# Patient Record
(demographics unavailable — no encounter records)

---

## 2024-11-07 NOTE — PHYSICAL EXAM
[de-identified] :  General: No acute distress, conversant, well-nourished. Head: Normocephalic, atraumatic Neck: trachea midline, FROM Heart: normotensive and normal rate and rhythm Lungs: No labored breathing Skin: No abrasions, no rashes, no edema Psych: Alert and oriented to person, place and time Extremities: no peripheral edema or digital cyanosis Gait: Normal gait. Can perform tandem gait.  Vascular: warm and well perfused distally, palpable distal pulses   MSK: Lumbar spine: + tenderness to palpation.  No step-off, no deformity.   NEURO EXAM: Sensation Left L2  -  2/2            Left L3  -  2/2 Left L4  -  2/2 Left L5  -  2/2 Left S1  -  2/2   Right L2  -  2/2            Right L3  -  2/2 Right L4  -  2/2 Right L5  -  2/2 Right S1  -  2/2   Motor: Left L2 (hip flexion)                            5/5                Left L3 (knee extension)                   5/5                Left L4 (ankle dorsiflexion)                 5/5                Left L5 (long toe extensor)                5/5                Left S1 (ankle plantar flexion)           5/5   Right L2 (hip flexion)                            5/5                Right L3 (knee extension)                   5/5                Right L4 (ankle dorsiflexion)                 5/5                Right L5 (long toe extensor)                5/5                Right S1 (ankle plantar flexion)           5/5   Reflexes: Normal and symmetric Negative clonus.  Down-going Babinski.

## 2024-11-07 NOTE — REASON FOR VISIT
[Follow-Up Visit] : a follow-up visit for [Back Pain] : back pain [Other: ____] : [unfilled] [FreeTextEntry2] : pain level knee 9/10,

## 2024-11-07 NOTE — HISTORY OF PRESENT ILLNESS
[de-identified] : 68 year old female followup with low back pain radiating to her right leg.  She has numbness in her right leg. She denies recent illness, fevers,  weakness, balance problems, saddle anesthesia, urinary retention or fecal incontinence. Since her last visit, patient had right THR with Dr. Schilling which has provided great relief. She had an epidural injection prior to the operation which gave her 50% relief. She has some lingering right sided lower back pain and pain over her SI joint.

## 2024-11-07 NOTE — DISCUSSION/SUMMARY
[de-identified] :  I, Dr. Hernandez personally performed the evaluation and management services for this established patient who presents today with (a) new problem(s)/exacerbation of (an) existing condition(s). That E/M includes conducting the clinically appropriate interval history &/or exam, assessing all new/exacerbated conditions, and establishing a new plan of care. Today, my ALAN, Alejandro Noel was here to observe my evaluation and management service for this new problem/exacerbated condition and follow the plan of care established by me going forward.

## 2024-11-07 NOTE — PHYSICAL EXAM
[de-identified] :  General: No acute distress, conversant, well-nourished. Head: Normocephalic, atraumatic Neck: trachea midline, FROM Heart: normotensive and normal rate and rhythm Lungs: No labored breathing Skin: No abrasions, no rashes, no edema Psych: Alert and oriented to person, place and time Extremities: no peripheral edema or digital cyanosis Gait: Normal gait. Can perform tandem gait.  Vascular: warm and well perfused distally, palpable distal pulses   MSK: Lumbar spine: + tenderness to palpation.  No step-off, no deformity.   NEURO EXAM: Sensation Left L2  -  2/2            Left L3  -  2/2 Left L4  -  2/2 Left L5  -  2/2 Left S1  -  2/2   Right L2  -  2/2            Right L3  -  2/2 Right L4  -  2/2 Right L5  -  2/2 Right S1  -  2/2   Motor: Left L2 (hip flexion)                            5/5                Left L3 (knee extension)                   5/5                Left L4 (ankle dorsiflexion)                 5/5                Left L5 (long toe extensor)                5/5                Left S1 (ankle plantar flexion)           5/5   Right L2 (hip flexion)                            5/5                Right L3 (knee extension)                   5/5                Right L4 (ankle dorsiflexion)                 5/5                Right L5 (long toe extensor)                5/5                Right S1 (ankle plantar flexion)           5/5   Reflexes: Normal and symmetric Negative clonus.  Down-going Babinski.

## 2024-11-07 NOTE — ASSESSMENT
[FreeTextEntry1] : 68 year old female followup with low back pain radiating to her right leg.  She has numbness in her right leg. She denies recent illness, fevers,  weakness, balance problems, saddle anesthesia, urinary retention or fecal incontinence. Since her last visit, patient had right THR with Dr. Schilling which has provided great relief. She had an epidural injection prior to the operation which gave her 50% relief. She has some lingering right sided lower back pain and pain over her SI joint. The patient was given a referral for consideration for spinal injections. Continue PT for hip. Continue HEP for lower back. F/U in 4-6 weeks. We discussed red flag symptoms that would require emergent evaluation. She knows to call with any questions or concerns or if her symptoms acutely worsen.

## 2024-11-07 NOTE — HISTORY OF PRESENT ILLNESS
[de-identified] : 68 year old female followup with low back pain radiating to her right leg.  She has numbness in her right leg. She denies recent illness, fevers,  weakness, balance problems, saddle anesthesia, urinary retention or fecal incontinence. Since her last visit, patient had right THR with Dr. Schilling which has provided great relief. She had an epidural injection prior to the operation which gave her 50% relief. She has some lingering right sided lower back pain and pain over her SI joint.

## 2024-11-15 NOTE — PROCEDURE
[FreeTextEntry1] : Interlaminar lumbar epidural steroid injection at L5-S1    The potential benefits as well as rare but possible risks were reviewed with the patient.  These risks including infection including epidural abscess, meningitis, osteomyelitis, and discitis, bleeding including epidural hematoma, nerve injury, paralysis, failure to relieve pain or worse pain, headache, pneumothorax, elevated blood sugars, allergic reactions, adverse reactions to medications, vasovagal reactions, falls, etc. Following that discussion, all questions were again answered to the patients satisfaction, the patient stated their verbal understanding and written consent was obtained.   After obtaining consent, pre-procedure blood pressure and heart rate were stable and recorded in the nursing record. Standard monitors were applied. The patient was placed in the prone position. The lumbosacral area was widely prepped with chloroprep and draped in sterile fashion. Fluoroscopic guidance was used to identify the desired interlaminar space and for needle placement. Subcutaneous 0.5% lidocaine was used to anesthetize the skin overlying the target. A 20-gauge Tuohy needle was advanced to the epidural space using loss of resistance to air technique and AP / contralateral oblique views under fluoroscopy. There was no evidence of heme or CSF and no paresthesias were elicited with needle placement.   Confirmation of epidural needle placement was performed with 0.5 cc of omnipaque Next 3 ml preservative free normal saline and 10 mg dexamethasone was administered epidurally with no pain elicited on injection. The needle was removed, skin cleansed with alcohol and a sterile bandage was applied. The patient tolerated the procedure well and no complications were encountered. Following the procedure, the patient's vital signs were stable. The patient was discharged home in good condition with post-procedural instructions.   Time Out: Immediately prior to the procedure, the following was verbally confirmed that there is a consent form and that the correct patient, planned procedure, site and side are consistent with documentation and that necessary equipment and/or blood products are available prior to the start of the case.   Complications: none EBL: <5 cc

## 2024-12-13 NOTE — ASSESSMENT
[FreeTextEntry1] :  68 year old female s/p right THR 8/28/24, with chronic low back pain and left knee pain, presenting for follow up after having a LESI L5-S1 on 11/15/24. Since the injection, she reports significant pain relief in the right side of low back and right posterior lateral radiculopathy. She has noticed some pain in the right buttock has returned, although minimal. Patient reports significant left knee pain, worse with daily activity, walking, and stairs. She is in the process of scheduling a TKR with Dr. López in the Spring 2025.   Plan: - Discussed Femoral SPR PNS. Patient was given brochure.  - Discussed Neuralace of femoral nerve. will do 1 session today - Patient to follow up for SPR

## 2024-12-13 NOTE — HISTORY OF PRESENT ILLNESS
[Back Pain] : back pain [Joint Pain] : joint  [FreeTextEntry1] : Patient is a 68 year old female s/p right THR 8/28/24, with chronic low back pain and left knee pain, presenting for follow up after having a LESI L5-S1 on 11/15/24. Since the injection, she reports significant pain relief in the right side of low back and right posterior lateral radiculopathy. She has noticed some pain in the right buttock has returned, although minimal.  Her main pain today is her left knee. She has had this pain x 5-6 years. She has a history of injury decades ago falling as an . She has a history of left knee cartilage surgery. She last had imaging of her knee and was told it is "bone-on-bone." She is working on scheduling a left knee replacement in April 2025.   She has been doing HEP and pilates.

## 2024-12-13 NOTE — PHYSICAL EXAM
If you receive a survey via e-mail or mail, please take the time to complete and return as your feedback is very helpful to our practice.               If your neurological testing is not going to be scheduled today our Pre-Service Department will contact you to schedule within one to two days. If you would like to call and schedule when it is convenient for you or if you need to reschedule your appointment please call the Pre-Service Department at [phone].    Your tests will be reviewed by the Benefits Department and if there are any concerns regarding prior authorization or financial obligation they will contact you. We recommend you still contact your insurance company to see if the test, provider, and location of service are covered, and if so, will there be any out of pocket expenses.     If you should have any concerns regarding the financial obligation of the tests please contact our Financial Advocates at [phone] and they will be happy to assist you.                 Thank you for letting us care for you today.             In order to make sure we are meeting our patients' needs, we require a 36 hour notice from you prior to picking up your medications. Attached is a table that will help you determine when your prescriptions will be ready for pick-up.                      If the refill is requested between when the clinic opens and 12 pm on  You can  your prescription at the pharmacy after 6 PM on   Monday Tuesday Tuesday Wednesday Wednesday Thursday Thursday Friday Friday Monday     If the refill is requested between 12 pm and after the clinic closes on You can  your prescription at the pharmacy after 12 PM on   Monday Wednesday Tuesday Thursday Wednesday Friday Thursday Monday Friday Tuesday        [Normal] : Regular, no tachycardia [Normal muscle bulk without asymmetry] : normal muscle bulk without asymmetry [Spinous Process Tenderness] : spinous process tenderness [] : Motor: [NL] : Motor strength of the right lower extremity was normal [___/5] : left ([unfilled]/5) [Motor Strength Lower Extremities] : left (5/5) [LE] : Sensory: Intact in bilateral lower extremities [de-identified] : left knee ttp on medial and lateral aspect  [de-identified] : limited ROM of left knee negative

## 2025-01-09 NOTE — HISTORY OF PRESENT ILLNESS
[de-identified] : 68 year old female followup with low back pain radiating to her right leg.  She has numbness in her right leg. She denies recent illness, fevers,  weakness, balance problems, saddle anesthesia, urinary retention or fecal incontinence. Since her last visit, patient had a lumbar epidural by Dr. Pavon with good relief. However she does feel some of her symptoms are returning.  Her knee is bothering her and she is going to see Dr. Schilling.  She is doing PT for her hip.

## 2025-01-09 NOTE — PHYSICAL EXAM
[de-identified] : General: No acute distress, conversant, well-nourished. Head: Normocephalic, atraumatic Neck: trachea midline, FROM Heart: normotensive and normal rate and rhythm Lungs: No labored breathing Skin: No abrasions, no rashes, no edema Psych: Alert and oriented to person, place and time Extremities: no peripheral edema or digital cyanosis Gait: Normal gait. Can perform tandem gait.   Vascular: warm and well perfused distally, palpable distal pulses MSK: Left Knee with no erythema, no effusion.   + tenderness to palpation of knee.  Range of motion: 0 - 130 degrees + pain with range of motion.  Negative Tova's test. Stable to varus and valgus stress. Negative Lachman's test, negative anterior and posterior drawer tests.    Sensation intact to light touch.   Normal motor exam. Warm and well perfused distally. Lumbar spine: No tenderness to palpation.  No step-off, no deformity.  NEURO EXAM: Sensation  Left L2  -  2/2             Left L3  -  2/2 Left L4  -  2/2 Left L5  -  2/2 Left S1  -  2/2  Right L2  -  2/2             Right L3  -  2/2 Right L4  -  2/2 Right L5  -  2/2 Right S1  -  2/2  Motor:  Left L2 (hip flexion)                            5/5                 Left L3 (knee extension)                   5/5                 Left L4 (ankle dorsiflexion)                 5/5                 Left L5 (long toe extensor)                5/5                 Left S1 (ankle plantar flexion)           5/5  Right L2 (hip flexion)                            5/5                 Right L3 (knee extension)                   5/5                 Right L4 (ankle dorsiflexion)                 5/5                 Right L5 (long toe extensor)                5/5                 Right S1 (ankle plantar flexion)           5/5  Reflexes: Normal and symmetric Negative clonus.  Down-going Babinski.    [de-identified] : Left knee 3 view radiographs no fracture or dislocation.  Severe age-related degenerative changes most pronounced in medial compartment. Partially imaged stem of KURT  I independently reviewed the patients MRI which show degenerative changes including severe foraminal stenosis.    MRI of the lumbar spine 11/10/2023  Transitional lumbosacral anatomy, with designation of a partially sacralized L5 transitional segment.  Multilevel degenerative changes contributing to mild spinal canal stenosis at L3-4, and mild thecal sac impingement at L2-3 and L4-5.  Moderate to severe right foraminal stenosis at L4-5, mild to moderate bilaterally at L3-4, and mild on the left at L2-3.  Grade 1 anterolisthesis of L3 on L4.  Moderate to severe lower lumbar facet arthrosis.  Dilatation of the partially imaged common bile duct. Recommend follow-up with abdominal ultrasound, and as warranted clinically, MRI/MRCP of the abdomen without and with contrast.

## 2025-01-09 NOTE — HISTORY OF PRESENT ILLNESS
[de-identified] : 68 year old female followup with low back pain radiating to her right leg.  She has numbness in her right leg. She denies recent illness, fevers,  weakness, balance problems, saddle anesthesia, urinary retention or fecal incontinence. Since her last visit, patient had a lumbar epidural by Dr. Pavon with good relief. However she does feel some of her symptoms are returning.  Her knee is bothering her and she is going to see Dr. Schilling.  She is doing PT for her hip.

## 2025-01-09 NOTE — REASON FOR VISIT
[Follow-Up Visit] : a follow-up visit for [Back Pain] : back pain [Other: ____] : [unfilled] [FreeTextEntry2] : pain level 6/10

## 2025-01-09 NOTE — ASSESSMENT
[FreeTextEntry1] : 68 year old female followup with low back pain radiating to her right leg.  She has numbness in her right leg. She denies recent illness, fevers,  weakness, balance problems, saddle anesthesia, urinary retention or fecal incontinence. Since her last visit, patient had a lumbar epidural by Dr. Pavon with good relief. However she does feel some of her symptoms are returning.  Her knee is bothering her and she is going to see Dr. Schilling.  She is doing PT for her hip.  She can continue PT.  Continue gabapentin. Can consider additional spinal injections.  F/U 6-8 weeks. We discussed red flag symptoms that would require emergent evaluation. She knows to call with any questions or concerns or if her symptoms acutely worsen.

## 2025-01-09 NOTE — PHYSICAL EXAM
[de-identified] : General: No acute distress, conversant, well-nourished. Head: Normocephalic, atraumatic Neck: trachea midline, FROM Heart: normotensive and normal rate and rhythm Lungs: No labored breathing Skin: No abrasions, no rashes, no edema Psych: Alert and oriented to person, place and time Extremities: no peripheral edema or digital cyanosis Gait: Normal gait. Can perform tandem gait.   Vascular: warm and well perfused distally, palpable distal pulses MSK: Left Knee with no erythema, no effusion.   + tenderness to palpation of knee.  Range of motion: 0 - 130 degrees + pain with range of motion.  Negative Tova's test. Stable to varus and valgus stress. Negative Lachman's test, negative anterior and posterior drawer tests.    Sensation intact to light touch.   Normal motor exam. Warm and well perfused distally. Lumbar spine: No tenderness to palpation.  No step-off, no deformity.  NEURO EXAM: Sensation  Left L2  -  2/2             Left L3  -  2/2 Left L4  -  2/2 Left L5  -  2/2 Left S1  -  2/2  Right L2  -  2/2             Right L3  -  2/2 Right L4  -  2/2 Right L5  -  2/2 Right S1  -  2/2  Motor:  Left L2 (hip flexion)                            5/5                 Left L3 (knee extension)                   5/5                 Left L4 (ankle dorsiflexion)                 5/5                 Left L5 (long toe extensor)                5/5                 Left S1 (ankle plantar flexion)           5/5  Right L2 (hip flexion)                            5/5                 Right L3 (knee extension)                   5/5                 Right L4 (ankle dorsiflexion)                 5/5                 Right L5 (long toe extensor)                5/5                 Right S1 (ankle plantar flexion)           5/5  Reflexes: Normal and symmetric Negative clonus.  Down-going Babinski.    [de-identified] : Left knee 3 view radiographs no fracture or dislocation.  Severe age-related degenerative changes most pronounced in medial compartment. Partially imaged stem of KURT  I independently reviewed the patients MRI which show degenerative changes including severe foraminal stenosis.    MRI of the lumbar spine 11/10/2023  Transitional lumbosacral anatomy, with designation of a partially sacralized L5 transitional segment.  Multilevel degenerative changes contributing to mild spinal canal stenosis at L3-4, and mild thecal sac impingement at L2-3 and L4-5.  Moderate to severe right foraminal stenosis at L4-5, mild to moderate bilaterally at L3-4, and mild on the left at L2-3.  Grade 1 anterolisthesis of L3 on L4.  Moderate to severe lower lumbar facet arthrosis.  Dilatation of the partially imaged common bile duct. Recommend follow-up with abdominal ultrasound, and as warranted clinically, MRI/MRCP of the abdomen without and with contrast.

## 2025-01-13 NOTE — DISCUSSION/SUMMARY
[de-identified] : Patient I discussed the possibility of needing knee replacement surgery advanced findings on radiographs both there and with her symptoms.  As a temporizing measure she was given a cortisone injection today.  We talked with reasonable risk benefits of knee replacement surgery including typical convalescent expectations we also mention because of retained hardware will have to use Ortho line for positioning of the femoral component.  As far as the knee is concerned right now she will ice the knee here in the office ice again tonight if symptomatic follow-up as needed.  Today's consultation regarding possible knee replacement surgery in the spring lasted 45 minutes.  This is 45 minutes exclusive of teaching time and any separately billed procedures.

## 2025-01-13 NOTE — PROCEDURE
[de-identified] : Patient was given a cortisone injection today there is no sterile conditions to the lateral aspect of the left knee patient taught injection well.

## 2025-01-13 NOTE — REASON FOR VISIT
[Follow-Up Visit] : a follow-up visit for [Knee Pain] : knee pain [FreeTextEntry2] : having a lot of pain, swelling and buckling in the left knee. doing some tests with dr Pavon for the knee, wants to have a check with dr SALCEDO to make sure everything is okay.

## 2025-01-13 NOTE — HISTORY OF PRESENT ILLNESS
[de-identified] : Well-known patient returns today with new complaint of worsening left medial knee pain.  She has established diagnosis for many years now advanced knee osteoarthritis with complete cartilage loss in the medial aspect.  Patient had undergone right hip replacement surgery with me because it was the larger issue in the past recently but now she is focusing mostly on the medial sided knee pain.  Her primary care physician will not allow her to have knee replacement surgery till the spring once the winter months of past due to possibility of increased chance of respiratory issues she is here for temporizing measure with the idea of possible knee replacement surgery in the spring.

## 2025-01-13 NOTE — PHYSICAL EXAM
[de-identified] : Left knee range of motion is 5 to 125 degrees knee stable hip stress varus valgus was both full extension and 90 degrees flexion there is a mild medial joint line tenderness.  There is some mild warmth minimal soft tissue swelling no effusion noted.  Neurovasc intact distally [de-identified] : Knee radiographs were ordered today AP standing individual and sunrise views were obtained showing complete loss of cartilage medial aspect of the left knee with secondary findings of osteophyte cyst formation and moderate varus and collation patient also has retained hardware from previous revision hip replacement surgery visible.

## 2025-01-17 NOTE — PROCEDURE
[de-identified] : Cortisone injection Lidocaine 1%: Lot #: [id number] Exp: 2025/ 11 Kenalog-10: Lot #: [id number] Exp: April 2027  Patient given bilateral cortisone injections today was done in sterile conditions the lateral joint line of each knee.  Patient tolerated injections well.

## 2025-01-17 NOTE — HISTORY OF PRESENT ILLNESS
[de-identified] : Patient is here for follow up on left knee pain. Cortisone injectionPatient returns today she was seen just 4 days ago earlier this week at that point he was given a left knee cortisone injection she states did not see any sustained symptomatic improvement is leaning towards moving forward with knee replacement surgery due to the fact that she is not having any sustained symptomatic improvement with these conservative measures.  She is here for repeat evaluation possible repeat cortisone injection with the understanding that any injection today would forestall any knee replacement surgery for at least 3 months.

## 2025-01-17 NOTE — DISCUSSION/SUMMARY
[de-identified] : In addition to today's cortisone injection patient was encouraged to ice the knees liberally and also take over-the-counter anti-inflammatories as needed.  We touched on the reasonable risk and benefits of knee replacement surgery including typical convalescent expectations expectations for implant longevity.  Patient will set up a surgery date sometime after April she will return to the office prior to the surgery date to once again review the reasonable risk and benefits of the proposed procedure.  Today's consultation lasted 30 minutes exclusive teaching time and any separately billed procedures.

## 2025-01-28 NOTE — PROCEDURE
[FreeTextEntry1] : Interlaminar lumbar epidural steroid injection L5-S1    The potential benefits as well as rare but possible risks were reviewed with the patient.  These risks including infection including epidural abscess, meningitis, osteomyelitis, and discitis, bleeding including epidural hematoma, nerve injury, paralysis, failure to relieve pain or worse pain, headache, pneumothorax, elevated blood sugars, allergic reactions, adverse reactions to medications, vasovagal reactions, falls, etc. Following that discussion, all questions were again answered to the patients satisfaction, the patient stated their verbal understanding and written consent was obtained.   After obtaining consent, pre-procedure blood pressure and heart rate were stable and recorded in the nursing record. Standard monitors were applied. The patient was placed in the prone position. The lumbosacral area was widely prepped with chloroprep and draped in sterile fashion. Fluoroscopic guidance was used to identify the desired interlaminar space and for needle placement. Subcutaneous 0.5% lidocaine was used to anesthetize the skin overlying the target. A 20-gauge Tuohy needle was advanced to the epidural space using loss of resistance to air technique and AP / contralateral oblique views under fluoroscopy. There was no evidence of heme or CSF and no paresthesias were elicited with needle placement.   Confirmation of epidural needle placement was performed with 0.5 cc of omnipaque Next 3 ml preservative free normal saline and 10 mg dexamethasone was administered epidurally with no pain elicited on injection. The needle was removed, skin cleansed with alcohol and a sterile bandage was applied. The patient tolerated the procedure well and no complications were encountered. Following the procedure, the patient's vital signs were stable. The patient was discharged home in good condition with post-procedural instructions.   Time Out: Immediately prior to the procedure, the following was verbally confirmed that there is a consent form and that the correct patient, planned procedure, site and side are consistent with documentation and that necessary equipment and/or blood products are available prior to the start of the case.   Complications: none EBL: <5 cc

## 2025-02-27 NOTE — HISTORY OF PRESENT ILLNESS
[de-identified] : RIYA KURT August 2024, Dr. Tonie BLAND KURT 2006 02/26/2025: 68-year-old female, presents with ongoing left knee pain she's had for years, she states that the pain is currently a 10 out of 10. She is previously a patient with Dr. Schilling who previously recommended left total knee replacement, however, due to Dr. Schilling' extended medical leave she was sent here for further workup. Of note, she has an extensive past medical history, including most recently a right hip replacement in August, for which she was on Eliquis afterwards. She also provided a sheet describing her past medical history as well, including a left hip replacement in 2006. She reports history of a pulmonary embolism at the time of her primary left hip replacement. She is somewhat unclear on whether she has had other episodes of VTE. She does recall having undergone some type of hematologic workup at some point in the past that was reportedly negative for a chronic hypercoagulable condition.  She is not on maintenance anticoagulation. She reports a significant allergy to NSAIDs, including anaphylaxis, and therefore cannot take any kind of anti-inflammatory medications. In terms of treatment, she has tried PT. She's doing PT for her right hip now, but also treating her left knee for that as well. She also received cortisone injections for both knees on January 17th, and she is aware of the time to surgery that has been minimum of three months. She wants to see Dr. Coleman for possible left knee surgery. She also wants to make it very, very clear that in all of her surgeries, she has a very low sensitivity to pain medications and therefore needs significant pain management involved for her surgeries, for pain control, including anesthesia and both postoperative pain as well. Pt. also reports that she had a left knee surgery for "torn cartilage" which occurred when she was approximately fourteen years old, and she has no other history of left knee surgery.

## 2025-02-27 NOTE — PHYSICAL EXAM
[de-identified] : General appearance: well nourished and hydrated, pleasant, alert and oriented x 3, cooperative.   HEENT: normocephalic, EOM intact, wearing mask, external auditory canal clear.   Cardiovascular: no lower leg edema, no varicosities, dorsalis pedis pulses palpable and symmetric.   Lymphatics: no palpable lymphadenopathy, no lymphedema.   Neurologic: sensation is normal, no muscle weakness in upper or lower extremities, patella tendon reflexes present and symmetric.   Dermatologic: skin moist, warm, no rash.   Spine: cervical spine with normal lordosis and painless range of motion, thoracic spine with normal kyphosis and painless range of motion, lumbosacral spine with normal lordosis and painless range of motion.  No tenderness to palpation along midline spine and paraspinal musculature.  Sacroiliac joints nontender bilaterally. Negative SLR and crossed SLR tests bilaterally. Gait: She presents with no assistive device. She demonstrates a marked left-sided limp. She also appears to be relatively imbalanced, and she has a relatively broad-based gait pattern, and she's demonstrating a type of steppage on the right. She has a mild quadriceps avoidance pattern on the right.   Left knee:  - Focal soft tissue swelling: none - Baker cyst: No palpable Baker's cysts - Ecchymosis: none - Erythema: none - Effusion: none - Wounds: She has a healed medial parapatellar incision, well healed and benign appearing.  - Alignment: mildly varus - Tenderness: none - ROM: 0-145 - Collateral laxity: demonstrate increased pseudo laxity to varus; The varus deformity is nearly fully correctable. She has marked bilateral thigh muscular atrophy, particularly affecting the left quadriceps. - Cruciate laxity: Demonstrate approximately from five to ten millimeters of anterior-posterior translation. - Popliteal angle (degrees): 50 - Quad strength: 4/5 - Extensor lag: none   [de-identified] : Bilateral knee x-rays were reviewed from [facility], dated January 13, 2025. These demonstrate for the right knee, normal alignments, Tricompartmental osteoarthritis most pronounced in the medial compartment, K&L 1-2. Patella sits at normal height and tracks centrally.   Left knee demonstrates varus alignment with lateral tibial subluxation. Tricompartmental osteoarthritis bone-on-bone medially, K&L 4. Patella sits at normal height and tracks centrally. The distal aspect of an intramedullary stem is visualized proceeding from the femoral diaphysis.  Bilateral hip x-rays were reviewed dated September 13, 2024, with imaging also available on [facility].  These demonstrate bilateral total hip replacements in position. On the left side, it appears to be a primary construct with hybrid cementation.  All components appear to be well-fixed in reasonable alignment without evidence of mechanical complication.   Left hip demonstrates a revision total hip replacement in position, with multiple cables cerclaging the proximal femur. All components appear to be well fixed and reasonable alignment, without evidence of mechanical complication, the bearing appears to be a tripolar.

## 2025-02-27 NOTE — DISCUSSION/SUMMARY
[de-identified] : IMP: 68-year-old female with severe left knee osteoarthritis, bilateral total hip replacements, and mild right knee osteoarthritis; in the setting of chronic lumbar spinal stenosis and bilateral radiculopathy with generalized deconditioning. - She's indicated at this time for a left total knee replacement. - We discussed the details of the procedure, the expected recovery period, and the expected outcome. We discussed the likelihood of satisfaction after complete recovery, and the potential causes of dissatisfaction. The importance of active patient participation in the rehabilitation protocol was emphasized, along with its influence on short and long-term outcomes. Specific risks of total knee replacement were discussed in detail. We discussed the risk of surgical site complications including but not limited to: surgical site infection, wound healing complications, bone fracture, tendon or ligament injury, neurovascular injury, hemorrhage, postoperative stiffness or instability, persistent pain and need for reoperation or manipulation under anesthesia. We discussed that it is typical to develop numbness of the knee lateral to the incision, which in most cases does improve over the course of the first postoperative year, but which can also be permanent. We discussed surgical blood loss and the possible need for blood transfusion. We discussed the risk of perioperative medical complications, including but not limited to catheter-associated urinary tract infection, venous thromboembolism and other cardiopulmonary complications. We discussed anesthetic options and the risk of anesthesia-related complications. We discussed implant fixation methods; my plan would be to use fully cemented fixation in this case. We discussed the variable need to resurface the patella; my plan would be to leave the patella unresurfaced in this case. We discussed the durability of prosthetic knees and limitations related to wear, osteolysis and loosening.  All questions were answered the patient's satisfaction. The patient was given a copy of my preoperative packet with additional information about the procedure. I asked the patient to either call back or schedule a followup appointment for any additional questions or concerns regarding the procedure. - She may proceed to surgery on or after April 17, 2025, in light of her recent cortisone injection, with clearance that she will obtain through her primary medical doctor. - She has a history of provoked PE and so will be referred to hematology to assess optimal anticoagulation strategy post-operatively.  At this point I think I would likely opt for Eliquis 2.5 milligrams BID for 30 days, but we will await the opinion of the hematologist.  - She has a severe anaphylactic intolerance to NSAIDs and aspirin, and so these cannot be used perioperatively. - She already has scheduled to see vascular surgery, which I think is reasonable.  - She does have a large stem in the femur, and so OrthAlign will be used on both sides in this case.  - We will plan to enlist the services of inpatient pain management given her history of medication intolerances and severe post-operative pain.  - She indicated that she has a strong preference to go to subacute rehabilitation following surgery, which is probably reasonable.  - We did discuss realistic expectations for recovery, given her severe muscular atrophy on the ipsilateral side.  - We discussed the importance of avoiding any further left knee intraarticular injections prior to surgery.

## 2025-02-27 NOTE — PHYSICAL EXAM
[de-identified] : General appearance: well nourished and hydrated, pleasant, alert and oriented x 3, cooperative.   HEENT: normocephalic, EOM intact, wearing mask, external auditory canal clear.   Cardiovascular: no lower leg edema, no varicosities, dorsalis pedis pulses palpable and symmetric.   Lymphatics: no palpable lymphadenopathy, no lymphedema.   Neurologic: sensation is normal, no muscle weakness in upper or lower extremities, patella tendon reflexes present and symmetric.   Dermatologic: skin moist, warm, no rash.   Spine: cervical spine with normal lordosis and painless range of motion, thoracic spine with normal kyphosis and painless range of motion, lumbosacral spine with normal lordosis and painless range of motion.  No tenderness to palpation along midline spine and paraspinal musculature.  Sacroiliac joints nontender bilaterally. Negative SLR and crossed SLR tests bilaterally. Gait: She presents with no assistive device. She demonstrates a marked left-sided limp. She also appears to be relatively imbalanced, and she has a relatively broad-based gait pattern, and she's demonstrating a type of steppage on the right. She has a mild quadriceps avoidance pattern on the right.   Left knee:  - Focal soft tissue swelling: none - Baker cyst: No palpable Baker's cysts - Ecchymosis: none - Erythema: none - Effusion: none - Wounds: She has a healed medial parapatellar incision, well healed and benign appearing.  - Alignment: mildly varus - Tenderness: none - ROM: 0-145 - Collateral laxity: demonstrate increased pseudo laxity to varus; The varus deformity is nearly fully correctable. She has marked bilateral thigh muscular atrophy, particularly affecting the left quadriceps. - Cruciate laxity: Demonstrate approximately from five to ten millimeters of anterior-posterior translation. - Popliteal angle (degrees): 50 - Quad strength: 4/5 - Extensor lag: none   [de-identified] : Bilateral knee x-rays were reviewed from [facility], dated January 13, 2025. These demonstrate for the right knee, normal alignments, Tricompartmental osteoarthritis most pronounced in the medial compartment, K&L 1-2. Patella sits at normal height and tracks centrally.   Left knee demonstrates varus alignment with lateral tibial subluxation. Tricompartmental osteoarthritis bone-on-bone medially, K&L 4. Patella sits at normal height and tracks centrally. The distal aspect of an intramedullary stem is visualized proceeding from the femoral diaphysis.  Bilateral hip x-rays were reviewed dated September 13, 2024, with imaging also available on [facility].  These demonstrate bilateral total hip replacements in position. On the left side, it appears to be a primary construct with hybrid cementation.  All components appear to be well-fixed in reasonable alignment without evidence of mechanical complication.   Left hip demonstrates a revision total hip replacement in position, with multiple cables cerclaging the proximal femur. All components appear to be well fixed and reasonable alignment, without evidence of mechanical complication, the bearing appears to be a tripolar.

## 2025-02-27 NOTE — HISTORY OF PRESENT ILLNESS
[de-identified] : RIYA KURT August 2024, Dr. Tonie BLAND KURT 2006 02/26/2025: 68-year-old female, presents with ongoing left knee pain she's had for years, she states that the pain is currently a 10 out of 10. She is previously a patient with Dr. Schilling who previously recommended left total knee replacement, however, due to Dr. Schilling' extended medical leave she was sent here for further workup. Of note, she has an extensive past medical history, including most recently a right hip replacement in August, for which she was on Eliquis afterwards. She also provided a sheet describing her past medical history as well, including a left hip replacement in 2006. She reports history of a pulmonary embolism at the time of her primary left hip replacement. She is somewhat unclear on whether she has had other episodes of VTE. She does recall having undergone some type of hematologic workup at some point in the past that was reportedly negative for a chronic hypercoagulable condition.  She is not on maintenance anticoagulation. She reports a significant allergy to NSAIDs, including anaphylaxis, and therefore cannot take any kind of anti-inflammatory medications. In terms of treatment, she has tried PT. She's doing PT for her right hip now, but also treating her left knee for that as well. She also received cortisone injections for both knees on January 17th, and she is aware of the time to surgery that has been minimum of three months. She wants to see Dr. Coleman for possible left knee surgery. She also wants to make it very, very clear that in all of her surgeries, she has a very low sensitivity to pain medications and therefore needs significant pain management involved for her surgeries, for pain control, including anesthesia and both postoperative pain as well. Pt. also reports that she had a left knee surgery for "torn cartilage" which occurred when she was approximately fourteen years old, and she has no other history of left knee surgery.

## 2025-02-27 NOTE — END OF VISIT
[FreeTextEntry3] : Documented by Jaimie Ayon acting as a scribe for Dr. Harley Coleman. 02/26/2025  All medical record entries made by the Scribe were at my, Dr. Harley Coleman, direction and personally dictated by me on 02/26/2025. I have reviewed the chart and agree that the record accurately reflects my personal performance of the history, physical exam, assessment and plan. I have also personally directed, reviewed, and agreed with the chart.

## 2025-02-27 NOTE — DISCUSSION/SUMMARY
[de-identified] : IMP: 68-year-old female with severe left knee osteoarthritis, bilateral total hip replacements, and mild right knee osteoarthritis; in the setting of chronic lumbar spinal stenosis and bilateral radiculopathy with generalized deconditioning. - She's indicated at this time for a left total knee replacement. - We discussed the details of the procedure, the expected recovery period, and the expected outcome. We discussed the likelihood of satisfaction after complete recovery, and the potential causes of dissatisfaction. The importance of active patient participation in the rehabilitation protocol was emphasized, along with its influence on short and long-term outcomes. Specific risks of total knee replacement were discussed in detail. We discussed the risk of surgical site complications including but not limited to: surgical site infection, wound healing complications, bone fracture, tendon or ligament injury, neurovascular injury, hemorrhage, postoperative stiffness or instability, persistent pain and need for reoperation or manipulation under anesthesia. We discussed that it is typical to develop numbness of the knee lateral to the incision, which in most cases does improve over the course of the first postoperative year, but which can also be permanent. We discussed surgical blood loss and the possible need for blood transfusion. We discussed the risk of perioperative medical complications, including but not limited to catheter-associated urinary tract infection, venous thromboembolism and other cardiopulmonary complications. We discussed anesthetic options and the risk of anesthesia-related complications. We discussed implant fixation methods; my plan would be to use fully cemented fixation in this case. We discussed the variable need to resurface the patella; my plan would be to leave the patella unresurfaced in this case. We discussed the durability of prosthetic knees and limitations related to wear, osteolysis and loosening.  All questions were answered the patient's satisfaction. The patient was given a copy of my preoperative packet with additional information about the procedure. I asked the patient to either call back or schedule a followup appointment for any additional questions or concerns regarding the procedure. - She may proceed to surgery on or after April 17, 2025, in light of her recent cortisone injection, with clearance that she will obtain through her primary medical doctor. - She has a history of provoked PE and so will be referred to hematology to assess optimal anticoagulation strategy post-operatively.  At this point I think I would likely opt for Eliquis 2.5 milligrams BID for 30 days, but we will await the opinion of the hematologist.  - She has a severe anaphylactic intolerance to NSAIDs and aspirin, and so these cannot be used perioperatively. - She already has scheduled to see vascular surgery, which I think is reasonable.  - She does have a large stem in the femur, and so OrthAlign will be used on both sides in this case.  - We will plan to enlist the services of inpatient pain management given her history of medication intolerances and severe post-operative pain.  - She indicated that she has a strong preference to go to subacute rehabilitation following surgery, which is probably reasonable.  - We did discuss realistic expectations for recovery, given her severe muscular atrophy on the ipsilateral side.  - We discussed the importance of avoiding any further left knee intraarticular injections prior to surgery.

## 2025-02-27 NOTE — END OF VISIT
[FreeTextEntry3] : Documented by aJimie Ayon acting as a scribe for Dr. Harley Coleman. 02/26/2025  All medical record entries made by the Scribe were at my, Dr. Harley Coleman, direction and personally dictated by me on 02/26/2025. I have reviewed the chart and agree that the record accurately reflects my personal performance of the history, physical exam, assessment and plan. I have also personally directed, reviewed, and agreed with the chart.

## 2025-03-06 NOTE — HISTORY OF PRESENT ILLNESS
[de-identified] : Patient returns today she is decided ultimately to wait for me to have her left knee replacement surgery.  She had been offered other excellent orthopedic surgeons to consult with but in the end would like to have us do her surgery as she was so pleased with the right hip replacement recently performed.  Patient is here to discuss the procedure.  She has well-known issues with PE that she has suffered previously with other surgeries but not the procedure that we did for her.

## 2025-03-06 NOTE — REASON FOR VISIT
[Knee Pain] : knee pain [FreeTextEntry2] : 67 y/o F pt is in office today for left knee states the last cortisone injections she received were not helpful. Pain level 9/10.

## 2025-03-06 NOTE — PHYSICAL EXAM
[de-identified] : Left knee range of motion is 5 to 125 degrees knee stable hip stress varus valgus was both full extension and 90 degrees flexion there is a mild medial joint line tenderness.  There is some mild warmth minimal soft tissue swelling no effusion noted.  Neurovasc intact distally

## 2025-03-06 NOTE — DISCUSSION/SUMMARY
[de-identified] : We talked at length about the reasonable risk and benefits of knee replacement surgery and potential complications.  We also warned the patient that unlike hip replacement surgery this can have a protracted recoveries with significant levels of discomfort.  Patient is aware of this.  She would like to move forward we also talked about typical convalescent expectations and expectations for implant longevity finally we reviewed our surgical approach as well as our implant selection criteria and the fact that due to a fairly long femoral component on that side would be using navigation for at least a femoral component.  Surgical risks reviewed. We talked about potential complications of surgery.  We talked about potential complications which may require revision surgery such as component migration wear, loosening, infection, instability and leg length equality.  Today's consultation lasted 50 minutes considering the patient's decision to move forward with elective left knee replacement surgery.  This 50 minutes exclusive teaching time and any separately billed procedures.

## 2025-03-12 NOTE — ASSESSMENT
[FreeTextEntry1] : 68 year old female followup with low back pain radiating to her right leg. She has numbness in her right leg. She denies recent illness, fevers, weakness, balance problems, saddle anesthesia, urinary retention or fecal incontinence. Since her last visit, patient had another lumbar epidural by Dr. Pavon with relief but the pain is beginning to return. She is scheduled to have left TKR with Dr. Schilling in May. Currently her pain is controlled with gabapentin, tizanidine, and lidocaine patches. She discontinued PT but is doing HEP. Continue gabapentin, tizanidine, and lidocaine patches. Continue HEP. Discussed having an epidural prior to TKR. F/U with Dr. Pavon. F/U in 4 months. We discussed red flag symptoms that would require emergent evaluation. She knows to call with any questions or concerns or if her symptoms acutely worsen.

## 2025-03-12 NOTE — REASON FOR VISIT
[Follow-Up Visit] : a follow-up visit for [Back Pain] : back pain [Other: ____] : [unfilled] [FreeTextEntry2] : pain level back 7/10, left knee 8/10

## 2025-03-12 NOTE — DISCUSSION/SUMMARY
[de-identified] :  I, Dr. Hernandez personally performed the evaluation and management services for this established patient who presents today with (a) new problem(s)/exacerbation of (an) existing condition(s). That E/M includes conducting the clinically appropriate interval history &/or exam, assessing all new/exacerbated conditions, and establishing a new plan of care. Today, my ALAN, Alejandro Noel was here to observe my evaluation and management service for this new problem/exacerbated condition and follow the plan of care established by me going forward.

## 2025-03-12 NOTE — HISTORY OF PRESENT ILLNESS
[de-identified] : 68 year old female followup with low back pain radiating to her right leg. She has numbness in her right leg. She denies recent illness, fevers, weakness, balance problems, saddle anesthesia, urinary retention or fecal incontinence. Since her last visit, patient had another lumbar epidural by Dr. Pavon with relief but the pain is beginning to return. She is scheduled to have left TKR with Dr. Schilling in May. Currently her pain is controlled with gabapentin, tizanidine, and lidocaine patches. She discontinued PT but is doing HEP.

## 2025-03-12 NOTE — PHYSICAL EXAM
[de-identified] :  General: No acute distress, conversant, well-nourished. Head: Normocephalic, atraumatic Neck: trachea midline, FROM Heart: normotensive and normal rate and rhythm Lungs: No labored breathing Skin: No abrasions, no rashes, no edema Psych: Alert and oriented to person, place and time Extremities: no peripheral edema or digital cyanosis Gait: Normal gait. Can perform tandem gait.  Vascular: warm and well perfused distally, palpable distal pulses   MSK: Lumbar spine: + tenderness to palpation.  No step-off, no deformity.   NEURO EXAM: Sensation Left L2  -  2/2            Left L3  -  2/2 Left L4  -  2/2 Left L5  -  2/2 Left S1  -  2/2   Right L2  -  2/2            Right L3  -  2/2 Right L4  -  2/2 Right L5  -  2/2 Right S1  -  2/2   Motor: Left L2 (hip flexion)                            5/5                Left L3 (knee extension)                   5/5                Left L4 (ankle dorsiflexion)                 5/5                Left L5 (long toe extensor)                5/5                Left S1 (ankle plantar flexion)           5/5   Right L2 (hip flexion)                            5/5                Right L3 (knee extension)                   5/5                Right L4 (ankle dorsiflexion)                 5/5                Right L5 (long toe extensor)                5/5                Right S1 (ankle plantar flexion)           5/5   Reflexes: Normal and symmetric Negative clonus.  Down-going Babinski. [de-identified] : Left knee 3 view radiographs no fracture or dislocation.  Severe age-related degenerative changes most pronounced in medial compartment. Partially imaged stem of KURT  I independently reviewed the patients MRI which show degenerative changes including severe foraminal stenosis.    MRI of the lumbar spine 11/10/2023  Transitional lumbosacral anatomy, with designation of a partially sacralized L5 transitional segment.  Multilevel degenerative changes contributing to mild spinal canal stenosis at L3-4, and mild thecal sac impingement at L2-3 and L4-5.  Moderate to severe right foraminal stenosis at L4-5, mild to moderate bilaterally at L3-4, and mild on the left at L2-3.  Grade 1 anterolisthesis of L3 on L4.  Moderate to severe lower lumbar facet arthrosis.  Dilatation of the partially imaged common bile duct. Recommend follow-up with abdominal ultrasound, and as warranted clinically, MRI/MRCP of the abdomen without and with contrast.

## 2025-05-01 NOTE — DISCUSSION/SUMMARY
[de-identified] : Patient along with her son in attendance talk about the reasonable risk and benefits of the procedure including potential complications we also reviewed potential complications that may require revision surgery such as infection loosening migration wear lysis and dislocation.  We reviewed typical convalescence expectations as well as implant selection criteria at length.  We also reviewed our surgical approach.  Patient was satisfied with our choices.  We also recommend that she see pain management and a formal preop visit to make sure there is no divergence from what should be her optimal care in terms of pain relief.  Today's consultation lasted 45 minutes exclusive teaching time.  This consultation consisted of the patient's decision to move forward with knee replacement surgery and all the attendant risks and benefits and typical convalescence expectations to be expected.

## 2025-05-01 NOTE — REASON FOR VISIT
[Follow-Up Visit] : a follow-up visit for [Knee Pain] : knee pain [FreeTextEntry2] : 69 YR OLD RETURNING FOR SAME ISSUE OF LEFT KNEE PAIN. THE PAIN IS THE SAME. NO INJURY/TRAMA SINCE LAST VISIT.  PATIENT IS HERE TO DISCUSS PREOP INFORMATION SURGERY SET FOR 5/14/25

## 2025-05-01 NOTE — HISTORY OF PRESENT ILLNESS
[de-identified] : Patient is here to discuss her upcoming left knee replacement surgery.  SHe is she is here to review all her pain medications and typical convalescent expectations as well as expectations for implant longevity.

## 2025-05-01 NOTE — PHYSICAL EXAM
[de-identified] : Left knee range of motion is 5 to 125 degrees knee stable hip stress varus valgus was both full extension and 90 degrees flexion there is a mild medial joint line tenderness.  There is some mild warmth minimal soft tissue swelling no effusion noted.  Neurovasc intact distally

## 2025-05-04 NOTE — PROCEDURE
[FreeTextEntry1] : Interlaminar lumbar epidural steroid injection at L5-S1    The potential benefits as well as rare but possible risks were reviewed with the patient.  These risks including infection including epidural abscess, meningitis, osteomyelitis, and discitis, bleeding including epidural hematoma, nerve injury, paralysis, failure to relieve pain or worse pain, headache, pneumothorax, elevated blood sugars, allergic reactions, adverse reactions to medications, vasovagal reactions, falls, etc. Following that discussion, all questions were again answered to the patients satisfaction, the patient stated their verbal understanding and written consent was obtained.   After obtaining consent, pre-procedure blood pressure and heart rate were stable and recorded in the nursing record. Standard monitors were applied. The patient was placed in the prone position. The lumbosacral area was widely prepped with chloroprep and draped in sterile fashion. Fluoroscopic guidance was used to identify the desired interlaminar space and for needle placement. Subcutaneous 0.5% lidocaine was used to anesthetize the skin overlying the target. A 20-gauge Tuohy needle was advanced to the epidural space using loss of resistance to air technique and AP / contralateral oblique views under fluoroscopy. There was no evidence of heme or CSF and no paresthesias were elicited with needle placement.   Confirmation of epidural needle placement was performed with 0.5 cc of omnipaque Next 3 ml preservative free normal saline and 10 mg dexamethasone was administered epidurally with no pain elicited on injection. The needle was removed, skin cleansed with alcohol and a sterile bandage was applied. The patient tolerated the procedure well and no complications were encountered. Following the procedure, the patient's vital signs were stable. The patient was discharged home in good condition with post-procedural instructions.   Time Out: Immediately prior to the procedure, the following was verbally confirmed that there is a consent form and that the correct patient, planned procedure, site and side are consistent with documentation and that necessary equipment and/or blood products are available prior to the start of the case.   Complications: none EBL: <5 cc No dexamethasone wsated

## 2025-06-02 NOTE — HISTORY OF PRESENT ILLNESS
[de-identified] : Patient returns today for first postop visit status post 5/14/2025 left knee replacement.  Patient was at subacute rehab has been home for about a week now.

## 2025-06-02 NOTE — REASON FOR VISIT
[Post-Operative Visit] : a post-operative visit for [Artificial Knee Joint] : an artificial knee joint [Total Knee Replacement Revision Surgery, Aftercare] : total knee replacement revision surgery, aftercare [FreeTextEntry2] : left knee replacement 5/14. 1st poa visit.

## 2025-06-02 NOTE — DISCUSSION/SUMMARY
[de-identified] : Patient was told she is doing quite well she is not flexing beyond 65 degrees but she is really attempted no flexion so far in her postop course.  The fact that she can flex to 65 degrees with her first attempted of flexion indicate that she would probably do well she will follow-up in 4 weeks time for repeat assessment.

## 2025-06-02 NOTE — PHYSICAL EXAM
[de-identified] : Bandage was removed wound is well-healed appropriate to minimal swelling.  Neurovascular intact distally range of motion 0 to 65 degrees. [de-identified] : Knee radiographs were today AP standing medial lateral sunrise views obtained showing well-positioned [employer] prosthesis right sided.